# Patient Record
Sex: MALE | Race: BLACK OR AFRICAN AMERICAN | Employment: FULL TIME | URBAN - METROPOLITAN AREA
[De-identification: names, ages, dates, MRNs, and addresses within clinical notes are randomized per-mention and may not be internally consistent; named-entity substitution may affect disease eponyms.]

---

## 2019-12-18 ENCOUNTER — HOSPITAL ENCOUNTER (EMERGENCY)
Age: 44
Discharge: HOME | End: 2019-12-18
Attending: EMERGENCY MEDICINE
Payer: OTHER GOVERNMENT

## 2019-12-18 VITALS
RESPIRATION RATE: 33 BRPM | DIASTOLIC BLOOD PRESSURE: 88 MMHG | TEMPERATURE: 98 F | SYSTOLIC BLOOD PRESSURE: 145 MMHG | HEART RATE: 114 BPM | OXYGEN SATURATION: 95 %

## 2019-12-18 DIAGNOSIS — K42.9 UMBILICAL HERNIA WITHOUT OBSTRUCTION AND WITHOUT GANGRENE: Primary | ICD-10-CM

## 2019-12-18 DIAGNOSIS — R10.84 ABDOMINAL PAIN, GENERALIZED: ICD-10-CM

## 2019-12-18 LAB
ALBUMIN SERPL-MCNC: 4.5 G/DL (ref 3.4–5)
ALBUMIN/GLOB SERPL: 0.9 {RATIO} (ref 0.8–1.7)
ALP SERPL-CCNC: 205 U/L (ref 45–117)
ALT SERPL-CCNC: 55 U/L (ref 16–61)
AMPHET UR QL SCN: NEGATIVE
ANION GAP SERPL CALC-SCNC: 11 MMOL/L (ref 3–18)
AST SERPL-CCNC: 24 U/L (ref 10–38)
BARBITURATES UR QL SCN: NEGATIVE
BASOPHILS # BLD: 0 K/UL (ref 0–0.1)
BASOPHILS NFR BLD: 0 % (ref 0–2)
BENZODIAZ UR QL: NEGATIVE
BILIRUB SERPL-MCNC: 0.3 MG/DL (ref 0.2–1)
BUN SERPL-MCNC: 15 MG/DL (ref 7–18)
BUN/CREAT SERPL: 11 (ref 12–20)
CALCIUM SERPL-MCNC: 10 MG/DL (ref 8.5–10.1)
CANNABINOIDS UR QL SCN: NEGATIVE
CHLORIDE SERPL-SCNC: 99 MMOL/L (ref 100–111)
CO2 SERPL-SCNC: 26 MMOL/L (ref 21–32)
COCAINE UR QL SCN: NEGATIVE
CREAT SERPL-MCNC: 1.41 MG/DL (ref 0.6–1.3)
DIFFERENTIAL METHOD BLD: ABNORMAL
EOSINOPHIL # BLD: 0 K/UL (ref 0–0.4)
EOSINOPHIL NFR BLD: 0 % (ref 0–5)
ERYTHROCYTE [DISTWIDTH] IN BLOOD BY AUTOMATED COUNT: 13.3 % (ref 11.6–14.5)
GLOBULIN SER CALC-MCNC: 5.1 G/DL (ref 2–4)
GLUCOSE SERPL-MCNC: 239 MG/DL (ref 74–99)
HCT VFR BLD AUTO: 49.6 % (ref 36–48)
HDSCOM,HDSCOM: NORMAL
HGB BLD-MCNC: 18 G/DL (ref 13–16)
LIPASE SERPL-CCNC: 64 U/L (ref 73–393)
LYMPHOCYTES # BLD: 0.6 K/UL (ref 0.9–3.6)
LYMPHOCYTES NFR BLD: 6 % (ref 21–52)
MCH RBC QN AUTO: 31.9 PG (ref 24–34)
MCHC RBC AUTO-ENTMCNC: 36.3 G/DL (ref 31–37)
MCV RBC AUTO: 87.9 FL (ref 74–97)
METHADONE UR QL: NEGATIVE
MONOCYTES # BLD: 0.3 K/UL (ref 0.05–1.2)
MONOCYTES NFR BLD: 3 % (ref 3–10)
NEUTS SEG # BLD: 8.4 K/UL (ref 1.8–8)
NEUTS SEG NFR BLD: 91 % (ref 40–73)
OPIATES UR QL: NEGATIVE
PCP UR QL: NEGATIVE
PLATELET # BLD AUTO: 363 K/UL (ref 135–420)
PMV BLD AUTO: 10 FL (ref 9.2–11.8)
POTASSIUM SERPL-SCNC: 3.5 MMOL/L (ref 3.5–5.5)
PROT SERPL-MCNC: 9.6 G/DL (ref 6.4–8.2)
RBC # BLD AUTO: 5.64 M/UL (ref 4.7–5.5)
SODIUM SERPL-SCNC: 136 MMOL/L (ref 136–145)
WBC # BLD AUTO: 9.2 K/UL (ref 4.6–13.2)

## 2019-12-18 PROCEDURE — 99285 EMERGENCY DEPT VISIT HI MDM: CPT

## 2019-12-18 PROCEDURE — 74011250636 HC RX REV CODE- 250/636: Performed by: EMERGENCY MEDICINE

## 2019-12-18 PROCEDURE — 96374 THER/PROPH/DIAG INJ IV PUSH: CPT

## 2019-12-18 PROCEDURE — 85025 COMPLETE CBC W/AUTO DIFF WBC: CPT

## 2019-12-18 PROCEDURE — 80053 COMPREHEN METABOLIC PANEL: CPT

## 2019-12-18 PROCEDURE — 74011250637 HC RX REV CODE- 250/637: Performed by: EMERGENCY MEDICINE

## 2019-12-18 PROCEDURE — 83690 ASSAY OF LIPASE: CPT

## 2019-12-18 PROCEDURE — 80307 DRUG TEST PRSMV CHEM ANLYZR: CPT

## 2019-12-18 RX ORDER — ACETAMINOPHEN 500 MG
1000 TABLET ORAL ONCE
Status: COMPLETED | OUTPATIENT
Start: 2019-12-18 | End: 2019-12-18

## 2019-12-18 RX ORDER — DICYCLOMINE HYDROCHLORIDE 20 MG/1
20 TABLET ORAL EVERY 6 HOURS
Qty: 20 TAB | Refills: 0 | Status: SHIPPED | OUTPATIENT
Start: 2019-12-18 | End: 2019-12-23

## 2019-12-18 RX ORDER — KETOROLAC TROMETHAMINE 15 MG/ML
15 INJECTION, SOLUTION INTRAMUSCULAR; INTRAVENOUS ONCE
Status: COMPLETED | OUTPATIENT
Start: 2019-12-18 | End: 2019-12-18

## 2019-12-18 RX ORDER — ONDANSETRON 4 MG/1
4 TABLET, ORALLY DISINTEGRATING ORAL
Qty: 10 TAB | Refills: 0 | Status: SHIPPED | OUTPATIENT
Start: 2019-12-18 | End: 2019-12-21

## 2019-12-18 RX ORDER — NAPROXEN 500 MG/1
500 TABLET ORAL 2 TIMES DAILY WITH MEALS
Qty: 14 TAB | Refills: 0 | Status: SHIPPED | OUTPATIENT
Start: 2019-12-18 | End: 2019-12-25

## 2019-12-18 RX ADMIN — ACETAMINOPHEN 1000 MG: 500 TABLET ORAL at 08:30

## 2019-12-18 RX ADMIN — SODIUM CHLORIDE, SODIUM LACTATE, POTASSIUM CHLORIDE, AND CALCIUM CHLORIDE 1000 ML: 600; 310; 30; 20 INJECTION, SOLUTION INTRAVENOUS at 11:12

## 2019-12-18 RX ADMIN — KETOROLAC TROMETHAMINE 15 MG: 15 INJECTION, SOLUTION INTRAMUSCULAR; INTRAVENOUS at 08:30

## 2019-12-18 NOTE — DISCHARGE INSTRUCTIONS
Patient Education        Hernia: Care Instructions  Your Care Instructions    A hernia develops when tissue bulges through a weak spot in the wall of your belly. The groin area and the navel are common areas for a hernia. A hernia can also develop near the area of a surgery you had before. Pressure from lifting, straining, or coughing can tear the weak area, causing the hernia to bulge and be painful. If you cannot push a hernia back into place, the tissue may become trapped outside the belly wall. If the hernia gets twisted and loses its blood supply, it will swell and die. This is called a strangulated hernia. It usually causes a lot of pain. It needs treatment right away. Some hernias need to be repaired to prevent a strangulated hernia. If your hernia causes symptoms or is large, you may need surgery. Follow-up care is a key part of your treatment and safety. Be sure to make and go to all appointments, and call your doctor if you are having problems. It's also a good idea to know your test results and keep a list of the medicines you take. How can you care for yourself at home? · Take care when lifting heavy objects. · Stay at a healthy weight. · Do not smoke. Smoking can cause coughing, which can cause your hernia to bulge. If you need help quitting, talk to your doctor about stop-smoking programs and medicines. These can increase your chances of quitting for good. · Talk with your doctor before wearing a corset or truss for a hernia. These devices are not recommended for treating hernias and sometimes can do more harm than good. There may be certain situations when your doctor thinks a truss would work, but these are rare. When should you call for help? Call your doctor now or seek immediate medical care if:    · You have new or worse belly pain.     · You are vomiting.     · You cannot pass stools or gas.     · You cannot push the hernia back into place with gentle pressure when you are lying down.   · The area over the hernia turns red or becomes tender.    Watch closely for changes in your health, and be sure to contact your doctor if you have any problems. Where can you learn more? Go to http://lisset-nicole.info/. Enter C129 in the search box to learn more about \"Hernia: Care Instructions. \"  Current as of: November 7, 2018  Content Version: 12.2  © 3366-9817 "[employer]. Care instructions adapted under license by [employer] (which disclaims liability or warranty for this information). If you have questions about a medical condition or this instruction, always ask your healthcare professional. [place] [address] any warranty or liability for your use of this information. Patient Education        Abdominal Pain: Care Instructions  Your Care Instructions    Abdominal pain has many possible causes. Some aren't serious and get better on their own in a few days. Others need more testing and treatment. If your pain continues or gets worse, you need to be rechecked and may need more tests to find out what is wrong. You may need surgery to correct the problem. Don't ignore new symptoms, such as fever, nausea and vomiting, urination problems, pain that gets worse, and dizziness. These may be signs of a more serious problem. Your doctor may have recommended a follow-up visit in the next 8 to 12 hours. If you are not getting better, you may need more tests or treatment. The doctor has checked you carefully, but problems can develop later. If you notice any problems or new symptoms, get medical treatment right away. Follow-up care is a key part of your treatment and safety. Be sure to make and go to all appointments, and call your doctor if you are having problems. It's also a good idea to know your test results and keep a list of the medicines you take. How can you care for yourself at home? · Rest until you feel better.   · To prevent dehydration, drink plenty of fluids, enough so that your urine is light yellow or clear like water. Choose water and other caffeine-free clear liquids until you feel better. If you have kidney, heart, or liver disease and have to limit fluids, talk with your doctor before you increase the amount of fluids you drink. · If your stomach is upset, eat mild foods, such as rice, dry toast or crackers, bananas, and applesauce. Try eating several small meals instead of two or three large ones. · Wait until 48 hours after all symptoms have gone away before you have spicy foods, alcohol, and drinks that contain caffeine. · Do not eat foods that are high in fat. · Avoid anti-inflammatory medicines such as aspirin, ibuprofen (Advil, Motrin), and naproxen (Aleve). These can cause stomach upset. Talk to your doctor if you take daily aspirin for another health problem. When should you call for help? Call 911 anytime you think you may need emergency care. For example, call if:    · You passed out (lost consciousness).     · You pass maroon or very bloody stools.     · You vomit blood or what looks like coffee grounds.     · You have new, severe belly pain.    Call your doctor now or seek immediate medical care if:    · Your pain gets worse, especially if it becomes focused in one area of your belly.     · You have a new or higher fever.     · Your stools are black and look like tar, or they have streaks of blood.     · You have unexpected vaginal bleeding.     · You have symptoms of a urinary tract infection. These may include:  ? Pain when you urinate. ? Urinating more often than usual.  ? Blood in your urine.     · You are dizzy or lightheaded, or you feel like you may faint.    Watch closely for changes in your health, and be sure to contact your doctor if:    · You are not getting better after 1 day (24 hours). Where can you learn more? Go to http://lisset-nicole.info/.   Enter C201 in the search box to learn more about \"Abdominal Pain: Care Instructions. \"  Current as of: June 26, 2019  Content Version: 12.2  © 6319-7926 [employer]. Care instructions adapted under license by [employer] (which disclaims liability or warranty for this information). If you have questions about a medical condition or this instruction, always ask your healthcare professional. [address] any warranty or liability for your use of this information.

## 2019-12-18 NOTE — ED PROVIDER NOTES
EMERGENCY DEPARTMENT HISTORY AND PHYSICAL EXAM    8:22 AM      Date: 12/18/2019  Patient Name: Mehreen Ramos    History of Presenting Illness     Chief Complaint   Patient presents with    Abdominal Pain         History Provided By: Patient      Additional History (Context): Mehreen Ramos is a 53 y.o. male with MELANIE who presents with abdominal pain. Started last night, he states it is around his hernia, also throughout his belly, he has had this hernia for a long time around his umbilicus, he has never seen a surgeon. It has been stuck out for a long time but he can always push it in. He had one episode of vomiting this morning and then vomited again in the ER once he arrived. No fevers, no urinary symptoms, had a bowel movement that was normal for him this morning. Of note, his wife states that she thought he used IV heroin this morning, patient denies this on interview but continues to fall asleep. PCP: None    Current Outpatient Medications   Medication Sig Dispense Refill    naproxen (NAPROSYN) 500 mg tablet Take 1 Tab by mouth two (2) times daily (with meals) for 7 days. 14 Tab 0    dicyclomine (BENTYL) 20 mg tablet Take 1 Tab by mouth every six (6) hours for 20 doses. 20 Tab 0    ondansetron (ZOFRAN ODT) 4 mg disintegrating tablet Take 1 Tab by mouth every eight (8) hours as needed for Nausea for up to 3 days. 10 Tab 0       Past History     Past Medical History:  No past medical history on file. Past Surgical History:  No past surgical history on file. Family History:  No family history on file. Social History:  Social History     Tobacco Use    Smoking status: Not on file   Substance Use Topics    Alcohol use: Not on file    Drug use: Not on file       Allergies:  No Known Allergies      Review of Systems       Review of Systems   Constitutional: Negative. Negative for activity change, chills and fever. HENT: Negative. Negative for ear pain, hearing loss and sore throat.     Eyes: Negative. Negative for pain and visual disturbance. Respiratory: Negative. Negative for cough, chest tightness and shortness of breath. Cardiovascular: Negative. Negative for chest pain and leg swelling. Gastrointestinal: Positive for abdominal pain, nausea and vomiting. Negative for abdominal distention. Genitourinary: Negative. Negative for dysuria and hematuria. Musculoskeletal: Negative. Skin: Negative. Negative for rash. Neurological: Negative. Negative for dizziness and headaches. Psychiatric/Behavioral: Negative. Negative for agitation and behavioral problems. Physical Exam     Visit Vitals  /88   Pulse (!) 114   Temp 98 °F (36.7 °C)   Resp (!) 33   SpO2 95%         Physical Exam  Constitutional:       Appearance: He is well-developed. Comments: Sleepy   HENT:      Head: Normocephalic and atraumatic. Eyes:      General:         Right eye: No discharge. Left eye: No discharge. Pupils: Pupils are equal, round, and reactive to light. Comments: Pinpoint pupils   Neck:      Musculoskeletal: Normal range of motion and neck supple. Vascular: No JVD. Trachea: No tracheal deviation. Cardiovascular:      Rate and Rhythm: Regular rhythm. Tachycardia present. Heart sounds: Normal heart sounds. No murmur. Pulmonary:      Effort: Pulmonary effort is normal. No respiratory distress. Breath sounds: Normal breath sounds. No wheezing or rales. Abdominal:      General: Bowel sounds are normal. There is no distension. Palpations: Abdomen is soft. Tenderness: There is no tenderness. There is no rebound. Comments: Moderately sized umbilical hernia present, soft, reducible, no skin color changes, no crepitus, does have pain in the right upper quadrant, positive Sharpe's   Musculoskeletal: Normal range of motion. General: No tenderness or deformity. Skin:     General: Skin is warm and dry.       Findings: No erythema or rash.   Neurological:      Cranial Nerves: No cranial nerve deficit. Comments: 5/5 strength UE/LE, 5/5 sensation UE/LE             Diagnostic Study Results     Labs -  No results found for this or any previous visit (from the past 12 hour(s)). Radiologic Studies -   No orders to display         Medical Decision Making   I am the first provider for this patient. I reviewed the vital signs, available nursing notes, past medical history, past surgical history, family history and social history. Vital Signs-Reviewed the patient's vital signs. Records Reviewed: Nursing Notes and Old Medical Records      Provider Notes (Medical Decision Making):     MDM  Number of Diagnoses or Management Options  Abdominal pain, generalized:   Umbilical hernia without obstruction and without gangrene:   Diagnosis management comments: Differential Diagnosis: Incarcerated hernia, strangulated hernia, cholecystitis, heroin abuse, withdrawal, bowel obstruction    Patient with umbilical hernia, however does not appear strangulated or incarcerated, more suggestive of cholecystitis, will obtain right upper quadrant bedside ultrasound, CMP, lipase, patient has had 2 episodes of vomiting, if not feeling significantly better after Tylenol and Toradol he will need a CT of his belly. He does appear to have used heroin this morning, will not give Narcan at this time, his sats did drop to 89% because he is not breathing quickly, will avoid Narcan if possible    Reevaluation: Patient allowed to rest, meds given, he is feeling better, no episodes of vomiting, no abdominal pain any longer, his vital signs have normalized despite the last charted vitals, on my pulse check it was within normal limits and he was not tachypneic. Discussed discharge versus further imaging and he is ready to go home, understands reasons to come back emergently for his hernia.   Bedside ultrasound of his gallbladder showing normal wall size, normal CBD, no pericholecystic fluid, no stones    Safe for d/c, careful return precautions given. Pt/family comfortable with plan    Nancy Pinon MD      Procedure Note - Limited Bedside Ultrasound- Gallbladder   8:17 AM  Performed by: dye  Indication: RUQ pain  Interpretation: normal  Transabdominal bedside US shows normal gallbladder   without gallbladder wall thickening, measuring 31mm. Gallstones were not seen. Pericholecystic fluid was not visualized. The common bile duct was measured at 30mm. The procedure took 1-15 minutes, and pt tolerated well. Diagnosis     Clinical Impression:   1. Umbilical hernia without obstruction and without gangrene    2. Abdominal pain, generalized        Disposition: home    Follow-up Information     Follow up With Specialties Details Why [facility]    [facility] EMERGENCY DEPT Emergency Medicine  As needed, If symptoms worsen [address]  [phone]    Amber Little MD Surgery In 1 week regarding your hernia [address] [address] [address] Barbosa In 3 days  [address] [facility] [address] [phone]           Discharge Medication List as of 12/18/2019  1:00 PM      START taking these medications    Details   naproxen (NAPROSYN) 500 mg tablet Take 1 Tab by mouth two (2) times daily (with meals) for 7 days. , Print, Disp-14 Tab, R-0      dicyclomine (BENTYL) 20 mg tablet Take 1 Tab by mouth every six (6) hours for 20 doses. , Print, Disp-20 Tab, R-0      ondansetron (ZOFRAN ODT) 4 mg disintegrating tablet Take 1 Tab by mouth every eight (8) hours as needed for Nausea for up to 3 days. , Print, Disp-10 Tab, R-0           _______________________________    Please note that this dictation was completed with obopay, the [employer] voice recognition software.   Quite often unanticipated grammatical, syntax, homophones, and other interpretive errors are inadvertently transcribed by the computer software. Please disregard these errors. Please excuse any errors that have escaped final proofreading.

## 2021-01-25 ENCOUNTER — HOSPITAL ENCOUNTER (OUTPATIENT)
Dept: PHYSICAL THERAPY | Age: 46
Discharge: HOME | End: 2021-01-25
Payer: OTHER GOVERNMENT

## 2021-01-25 PROCEDURE — 97535 SELF CARE MNGMENT TRAINING: CPT

## 2021-01-25 PROCEDURE — 97110 THERAPEUTIC EXERCISES: CPT

## 2021-01-25 PROCEDURE — 97162 PT EVAL MOD COMPLEX 30 MIN: CPT

## 2021-01-25 NOTE — PROGRESS NOTES
[facility], [address] [place] [address]  [phone] fax  Plan of Care/ Statement of Necessity for Physical Therapy Services     Patient name: Anna Martinez Start of Care: 2021   Referral source: Krissy Ott MD : 1975    Medical Diagnosis: Low back pain [M54.5]  Payor: Darren Sims / Plan: [id number] / Product Type: [employer] Programs /  Onset Date: Initial 2003, fell in 2020    Treatment Diagnosis: LBP, left LE pain/numbness/weakness   Prior Hospitalization: see medical history Provider#: [id number]   Medications: Verified on Patient summary List    Comorbidities: HTN, hx tobacco and alcohol use   Prior Level of Function: Independent with ADLs, functional, and work tasks with progressively worsening LBP and left LE weakness. The Plan of Care and following information is based on the information from the initial evaluation. Assessment/ key information:   Pt is a 50year old male who presents to therapy today with LBP and left LE pain/numbness/weakness. 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 Pt reports progressively worsening symptoms since and denies any new injury except falling in 2020 when his left LE gave out on him while walking. He reports having increased pain with standing/sitting and has trouble sleeping. He reports that his LE symptoms usually refer to the posterior/lateral left leg and he feels it is getting weaker. He reports noticing increased urinary and bowel urgency but no complete dysfunction, and denies saddle paraesthesias. Pt demonstrated decreased l/s and painful AROM. Global left LE weakness is noted, especially with left hip flex and knee EXT. 2+ B achilles reflexes, 2+ right patellar reflex, 1+ left patellar reflex.  Reported having decreased sensation to light palpation throughout the left LE. Possible passive SLR test on left>right for increased LBP and HS tightness. MMT left hip flex 2-/5, left knee EXT 2-/5. Educated pt to monitor his bowel/bladder urgency and to contact his MD if this worsens. Pt would benefit from physical therapy to improve the above impairments to help the pt return to performing ADLs, functional and work activities. Evaluation Complexity History HIGH Complexity :3+ comorbidities / personal factors will impact the outcome/ POC ; Examination HIGH Complexity : 4+ Standardized tests and measures addressing body structure, function, activity limitation and / or participation in recreation  ;Presentation MEDIUM Complexity : Evolving with changing characteristics  ; Clinical Decision Making HIGH Complexity : FOTO score of 1- 25   Overall Complexity Rating: MEDIUM  Problem List: pain affecting function, decrease ROM, decrease strength, impaired gait/ balance, decrease ADL/ functional abilitiies, decrease activity tolerance, decrease flexibility/ joint mobility and decrease transfer abilities   Treatment Plan may include any combination of the following: Therapeutic exercise, Therapeutic activities, Neuromuscular re-education, Physical agent/modality, Gait/balance training, Manual therapy, Patient education, Self Care training, Functional mobility training, Home safety training and Stair training  Patient / Family readiness to learn indicated by: asking questions, trying to perform skills and interest  Persons(s) to be included in education: patient (P)  Barriers to Learning/Limitations: None  Patient Goal (s): ease pain, minimize pain  Patient Self Reported Health Status: fair  Rehabilitation Potential: fair    Short Term Goals: To be accomplished in 2 treatments:  1. Pt will report compliance and independence to [facility] to help the pt manage their pain and symptoms. Eval: established   Long Term Goals: To be accomplished in 10 treatments:  1.  Pt will increase FOTO score to 45 points to improve ability to perform ADLs. Eval: 25 points  2. Pt will increase MMT left hip flex to 3/5, left knee EXT to 3/5 to improve ability to tolerate prolonged sitting. Eval: left hip flex 2-/5, left knee EXT 2-/5  3. Pt will increase AROM l/s flex to 25-50% of WNL, EXT to % of WNL, right rotation to 50-75% of WNL with mild to no increased LBP to improve ability to perform work activities. Eval: l/s flex 0-25% of WNL, EXT 50-75% of WNL, right rotation to 25-50% of WNL all with increased LBP. 4. Pt will report having moderate difficulty with performing his usual work, housework, or school activities to improve the pt's activity tolerance. Eval: extreme difficulty. Frequency / Duration: Patient to be seen 1-2 times per week for 10 treatments. Patient/ Caregiver education and instruction: Diagnosis, prognosis, self care, activity modification and exercises   [x]  Plan of care has been reviewed with JACQUELINE Lora, PT 1/25/2021 10:56 AM  _____________________________________________________________________  I certify that the above Therapy Services are being furnished while the patient is under my care. I agree with the treatment plan and certify that this therapy is necessary.     Physician's Signature:____________Date:_________TIME:________    ** Signature, Date and Time must be completed for valid certification **    Please sign and return to [facility] [address]  [phone] fax

## 2021-01-25 NOTE — PROGRESS NOTES
PT DAILY TREATMENT NOTE  10-18    Patient Name: Smitha Adames  Date:2021  : 1975  [x]  Patient  Verified  Payor: [facility] CCN / [facility] / Product Type: [employer] Programs /    In time: 9:48  Out time:10:32  Total Treatment Time (min): 42  Visit #: 1 of 10    Treatment Area: Low back pain [M54.5]    SUBJECTIVE  Pain Level (0-10 scale): 8  Any medication changes, allergies to medications, adverse drug reactions, diagnosis change, or new procedure performed?: [x] No    [] Yes (see summary sheet for update)  Subjective functional status/changes:   [] No changes reported  See POC    OBJECTIVE    20 min [x]Eval                  []Re-Eval     10 min Therapeutic Exercise:  [] See flow sheet : HEP instruction and demonstration   Rationale: increase ROM and increase strength to improve the patients ability to tolerate ADLs    12 min Self Care/Home Management: []  See flow sheet : pt education regarding anatomy and physiology of the spine/LEs and how it relates to the pt's condition, pt education on monitoring his bowel/bladder symptoms and to contact his MD/go to ED if this worsens   Rationale: increase ROM, increase strength and decrease pain/symptoms  to improve the patients ability to tolerate functional tasks. With   [x] TE   [] TA   [] neuro   [x] Other: Self Care/Home management Patient Education: [x] Review HEP    [] Progressed/Changed HEP based on:   [] positioning   [] body mechanics   [] transfers   [] heat/ice application    [] other:      Other Objective/Functional Measures: See evaluation. Pain Level (0-10 scale) post treatment: 8    ASSESSMENT/Changes in Function: Pt given HEP handout to perform. Pt understood exercises in HEP handout. Pt demonstrated decreased l/s and painful AROM. Global left LE weakness is noted, especially with left hip flex and knee EXT. 2+ B achilles reflexes, 2+ right patellar reflex, 1+ left patellar reflex.  Reported having decreased sensation to light palpation throughout the left LE. Possible passive SLR test on left>right for increased LBP and HS tightness. MMT left hip flex 2-/5, left knee EXT 2-/5. Educated pt to monitor his bowel/bladder urgency and to contact his MD if this worsens. Pt would benefit from physical therapy to improve the above impairments to help the pt return to performing ADLs, functional and work activities. Patient will continue to benefit from skilled PT services to modify and progress therapeutic interventions, address functional mobility deficits, address ROM deficits, address strength deficits, analyze and address soft tissue restrictions, analyze and cue movement patterns, analyze and modify body mechanics/ergonomics, assess and modify postural abnormalities, address imbalance/dizziness and instruct in home and community integration to attain remaining goals. [x]  See Plan of Care  []  See progress note/recertification  []  See Discharge Summary         Progress towards goals / Updated goals:  Short Term Goals: To be accomplished in 2 treatments:  1. Pt will report compliance and independence to [facility] to help the pt manage their pain and symptoms. Eval: established   Long Term Goals: To be accomplished in 10 treatments:  1. Pt will increase FOTO score to 45 points to improve ability to perform ADLs. Eval: 25 points  2. Pt will increase MMT left hip flex to 3/5, left knee EXT to 3/5 to improve ability to tolerate prolonged sitting. Eval: left hip flex 2-/5, left knee EXT 2-/5  3. Pt will increase AROM l/s flex to 25-50% of WNL, EXT to % of WNL, right rotation to 50-75% of WNL with mild to no increased LBP to improve ability to perform work activities. Eval: l/s flex 0-25% of WNL, EXT 50-75% of WNL, right rotation to 25-50% of WNL all with increased LBP.    4. Pt will report having moderate difficulty with performing his usual work, housework, or school activities to improve the pt's activity tolerance. Eval: extreme difficulty.      PLAN  [x]  Upgrade activities as tolerated     [x]  Continue plan of care  [x]  Update interventions per flow sheet       []  Discharge due to:_  []  Other:_      Олег Osborne, PT 1/25/2021  10:56 AM    Future Appointments   Date Time Provider Arturo Farnsworth   1/25/2021  9:45 AM Kamran De León, PT [facility]

## 2021-02-01 ENCOUNTER — HOSPITAL ENCOUNTER (OUTPATIENT)
Dept: PHYSICAL THERAPY | Age: 46
Discharge: HOME | End: 2021-02-01
Payer: OTHER GOVERNMENT

## 2021-02-01 PROCEDURE — 97112 NEUROMUSCULAR REEDUCATION: CPT

## 2021-02-01 PROCEDURE — 97110 THERAPEUTIC EXERCISES: CPT

## 2021-02-01 NOTE — PROGRESS NOTES
PT DAILY TREATMENT NOTE 10-18    Patient Name: Chaparro Navas  Date:2021  : 1975  [x]  Patient  Verified  Payor[facility] / [facility] / Product Type: Federal Funded Programs /    In [employer] time:11;15  Total Treatment Time (min): 55  Visit #: 2 of 10    Medicare/BCBS Only   Total Timed Codes (min):   1:1 Treatment Time:         Treatment Area: Low back pain [M54.5]    SUBJECTIVE  Pain Level (0-10 scale): 8  Any medication changes, allergies to medications, adverse drug reactions, diagnosis change, or new procedure performed?: [x] No    [] Yes (see summary sheet for update)  Subjective functional status/changes:   [] No changes reported  \"I tried some of the exercises at home, they were OK. The exercise where I was sitting down and stretching my leg was too painful. \"    OBJECTIVE    16 min Therapeutic Exercise:  [x] See flow sheet :   Rationale: increase ROM and increase strength to improve LE strength/mobility and  lumbar mobility to improve ease of ADLs and gait. 30 min Neuromuscular Re-education:  [x]  See flow sheet :   Rationale: increase strength, improve coordination, improve balance and increase proprioception  to improve the patients ability to perform functional tasks with improved abdominal brace utilization, improved control, and decreased risk for falls. With   [x] TE   [] TA   [x] neuro   [] other: Patient Education: [x] Review HEP    [x] Progressed/Changed HEP based on: quad weakness   [] positioning   [x] body mechanics   [] transfers   [] heat/ice application    [] other:      Other Objective/Functional Measures: -Initiated treatment per flowsheet     Pain Level (0-10 scale) post treatment: 9    ASSESSMENT/Changes in Function: Patient reports increased pain/difficulty with right lumbar side bend as opposed to left side bend. Main focus of today's session is careful/progressive introduction to posterior pelvic tilt hold with UE/LE movement. Patient presents with continued left quadriceps weakness and left LE numbness that impacts his stability with standing activities. Patient will continue to benefit from skilled PT services to modify and progress therapeutic interventions, address functional mobility deficits, address ROM deficits, address strength deficits, analyze and address soft tissue restrictions, analyze and cue movement patterns, analyze and modify body mechanics/ergonomics, assess and modify postural abnormalities and address imbalance/dizziness to attain remaining goals. []  See Plan of Care  []  See progress note/recertification  []  See Discharge Summary         Progress towards goals / Updated goals:  Short Term Goals: To be accomplished in 2 treatments:  1. Pt will report compliance and independence to [facility] to help the pt manage their pain and symptoms. Eval: established   Current: progressing, pt reports intermittent compliance  Long Term Goals: To be accomplished in 10 treatments:  1. Pt will increase FOTO score to 45 points to improve ability to perform ADLs. Eval: 25 points   Current:   2. Pt will increase MMT left hip flex to 3/5, left knee EXT to 3/5 to improve ability to tolerate prolonged sitting. Eval: left hip flex 2-/5, left knee EXT 2-/5   Current:   3. Pt will increase AROM l/s flex to 25-50% of WNL, EXT to % of WNL, right rotation to 50-75% of WNL with mild to no increased LBP to improve ability to perform work activities. Eval: l/s flex 0-25% of WNL, EXT 50-75% of WNL, right rotation to 25-50% of WNL all with increased LBP. Current:   4. Pt will report having moderate difficulty with performing his usual work, housework, or school activities to improve the pt's activity tolerance. Eval: extreme difficulty.   Current:      PLAN  [x]  Upgrade activities as tolerated     [x]  Continue plan of care  []  Update interventions per flow sheet       []  Discharge due to:_  []  Other:_      Debora Koehler [facility] 2/1/2021  8:52 AM    Future Appointments   Date Time Provider Department Center   2/1/2021 10:30 AM Roberto [facility]   2/9/2021  9:45 AM Jade Almazan [facility]   2/16/2021  9:45 AM Donita López [facility]   2/23/2021  9:45 AM Navi, [address]

## 2021-02-02 ENCOUNTER — APPOINTMENT (OUTPATIENT)
Dept: PHYSICAL THERAPY | Age: 46
End: 2021-02-02
Payer: OTHER GOVERNMENT

## 2021-02-11 ENCOUNTER — HOSPITAL ENCOUNTER (OUTPATIENT)
Dept: PHYSICAL THERAPY | Age: 46
Discharge: HOME | End: 2021-02-11
Payer: OTHER GOVERNMENT

## 2021-02-11 PROCEDURE — 97112 NEUROMUSCULAR REEDUCATION: CPT

## 2021-02-11 PROCEDURE — 97110 THERAPEUTIC EXERCISES: CPT

## 2021-02-11 NOTE — PROGRESS NOTES
PT DAILY TREATMENT NOTE     Patient Name: Tk Peng  Date:2021  : 1975  [x]  Patient  Verified  Payor: [facility] CCN / [facility] / Product Type: Federal Funded Programs /    In time:9:45  Out time:10:35  Total Treatment Time (min): 50  Visit #: 3 of 10    Medicare/BCBS Only   Total Timed Codes (min):   1:1 Treatment Time:         Treatment Area: Low back pain [M54.5]    SUBJECTIVE  Pain Level (0-10 scale): 7/10  Any medication changes, allergies to medications, adverse drug reactions, diagnosis change, or new procedure performed?: [x] No    [] Yes (see summary sheet for update)  Subjective functional status/changes:   [] No changes reported  \"I hurt after last session. I'm okay now, but I still have pain. \"    OBJECTIVE    8 min Therapeutic Exercise:  [] See flow sheet :   Rationale: increase ROM and increase strength to improve the patients ability to improve LE strength and trunk mobility for increase ease of ADLs    42 min Neuromuscular Re-education:  []  See flow sheet :   Rationale: increase strength, improve coordination and increase proprioception  to improve the patients ability to increase core activation and trunk stability to reduce strain on low back with ADLs, job duties          With   [] TE   [] TA   [x] neuro   [] other: Patient Education: [x] Review HEP    [] Progressed/Changed HEP based on:   [] positioning   [] body mechanics   [] transfers   [] heat/ice application    [] other:      Other Objective/Functional Measures: Performed exercises per flow sheet. Added trapeze bar hip x 3. Pain Level (0-10 scale) post treatment: -6/10    ASSESSMENT/Changes in Function: Pt given targets to work on increasing hip flexion during standing marches and hip flexion portions of trapeze bar exercise.   Pt given verbal and demonstrative cueing for proper TransA muscle contraction and technique with all plinth exercises to reduce strain to low back and not aggravate pain. Pt able to report slight decrease in pain symptoms and improved mobility after session. Patient will continue to benefit from skilled PT services to address functional mobility deficits, address ROM deficits, address strength deficits, analyze and address soft tissue restrictions, analyze and cue movement patterns, analyze and modify body mechanics/ergonomics, assess and modify postural abnormalities and instruct in home and community integration to attain remaining goals. []  See Plan of Care  []  See progress note/recertification  []  See Discharge Summary         Progress towards goals / Updated goals:  Short Term Goals: To be accomplished in 2 treatments:  1. Pt will report compliance and independence to [facility] to help the pt manage their pain and symptoms.           Eval: established   Current: progressing, pt reports intermittent compliance  Long Term Goals: To be accomplished in 10 treatments:  1. Pt will increase FOTO score to 45 points to improve ability to perform ADLs. Eval: 25 points   Current: reassess at MD note  2. Pt will increase MMT left hip flex to 3/5, left knee EXT to 3/5 to improve ability to tolerate prolonged sitting. Eval: left hip flex 2-/5, left knee EXT 2-/5   Current:   3. Pt will increase AROM l/s flex to 25-50% of WNL, EXT to % of WNL, right rotation to 50-75% of WNL with mild to no increased LBP to improve ability to perform work activities. Eval: l/s flex 0-25% of WNL, EXT 50-75% of WNL, right rotation to 25-50% of WNL all with increased LBP.   Current:   4. Pt will report having moderate difficulty with performing his usual work, housework, or school activities to improve the pt's activity tolerance.   Eval: extreme difficulty.   Current:     PLAN  [x]  Upgrade activities as tolerated     [x]  Continue plan of care  []  Update interventions per flow sheet       []  Discharge due to:_  []  Other:_      Olya Bourne PT 2/11/2021  9:51 AM    Future Appointments   Date Time Provider Arturo Farnsworth   2/16/2021  9:45 AM Sergio [facility]DY [address]   2/23/2021  9:45 AM Navi, [address]

## 2021-02-16 ENCOUNTER — HOSPITAL ENCOUNTER (OUTPATIENT)
Dept: PHYSICAL THERAPY | Age: 46
Discharge: HOME | End: 2021-02-16
Payer: OTHER GOVERNMENT

## 2021-02-16 PROCEDURE — 97110 THERAPEUTIC EXERCISES: CPT

## 2021-02-16 PROCEDURE — 97112 NEUROMUSCULAR REEDUCATION: CPT

## 2021-02-16 NOTE — PROGRESS NOTES
PT DAILY TREATMENT NOTE     Patient Name: Cyndee Latif  Date:2021  : 1975  [x]  Patient  Verified  Payor: [facility] CCN / Plan: [facility] CCN / Product Type: Federal Funded Programs /    In time:10:00  Out time:10:40  Total Treatment Time (min): 40  Visit #: 4 of 10    Medicare/BCBS Only   Total Timed Codes (min):   1:1 Treatment Time:         Treatment Area: Low back pain [M54.5]    SUBJECTIVE  Pain Level (0-10 scale): 7  Any medication changes, allergies to medications, adverse drug reactions, diagnosis change, or new procedure performed?: [x] No    [] Yes (see summary sheet for update)  Subjective functional status/changes:   [] No changes reported  I can stand and walk a little longer now    OBJECTIVE        10 min Therapeutic Exercise:  [] See flow sheet :   Rationale: increase ROM and increase strength to improve the patients ability to improve ease of ADL's     30 min Neuromuscular Re-education:  []  See flow sheet :   Rationale: increase strength and increase proprioception  to improve the patients ability to engage core for lumbar support, job duties          With   [] TE   [] TA   [] neuro   [] other: Patient Education: [x] Review HEP    [] Progressed/Changed HEP based on:   [] positioning   [] body mechanics   [] transfers   [] heat/ice application    [] other:      Other Objective/Functional Measures: ex's per card     Pain Level (0-10 scale) post treatment: 6    ASSESSMENT/Changes in Function: pt reports slight improvement with standing/walking tolerance.   Fatigued after session    Patient will continue to benefit from skilled PT services to modify and progress therapeutic interventions, address functional mobility deficits, address ROM deficits, address strength deficits, analyze and address soft tissue restrictions, analyze and cue movement patterns, analyze and modify body mechanics/ergonomics, assess and modify postural abnormalities, address imbalance/dizziness and instruct in home and community integration to attain remaining goals. []  See Plan of Care  []  See progress note/recertification  []  See Discharge Summary         Progress towards goals / Updated goals:  1. Pt will report compliance and independence to [facility] to help the pt manage their pain and symptoms.           Eval: established   Current: progressing, pt reports intermittent compliance   Long Term Goals: To be accomplished in 10 treatments:  1. Pt will increase FOTO score to 45 points to improve ability to perform ADLs. Eval: 25 points   Current: reassess at MD note  2. Pt will increase MMT left hip flex to 3/5, left knee EXT to 3/5 to improve ability to tolerate prolonged sitting. Eval: left hip flex 2-/5, left knee EXT 2-/5   Current:   3. Pt will increase AROM l/s flex to 25-50% of WNL, EXT to % of WNL, right rotation to 50-75% of WNL with mild to no increased LBP to improve ability to perform work activities. Eval: l/s flex 0-25% of WNL, EXT 50-75% of WNL, right rotation to 25-50% of WNL all with increased LBP.   Current:   4. Pt will report having moderate difficulty with performing his usual work, housework, or school activities to improve the pt's activity tolerance.   Eval: extreme difficulty.   Current:  improving but still dfficult    PLAN  [x]  Upgrade activities as tolerated     []  Continue plan of care  []  Update interventions per flow sheet       []  Discharge due to:_  [x]  Other:_  Reassess next session    Kenney Cuenca PTA 2/16/2021  10:13 AM    Future Appointments   Date Time Provider Arturo Farnsworth   2/23/2021  9:45 AM Navi, [address]

## 2021-02-23 ENCOUNTER — HOSPITAL ENCOUNTER (OUTPATIENT)
Dept: PHYSICAL THERAPY | Age: 46
Discharge: HOME | End: 2021-02-23
Payer: OTHER GOVERNMENT

## 2021-02-23 PROCEDURE — 97110 THERAPEUTIC EXERCISES: CPT

## 2021-02-23 PROCEDURE — 97530 THERAPEUTIC ACTIVITIES: CPT

## 2021-02-23 NOTE — PROGRESS NOTES
[facility] MALICK PERRIN [facility], [address] [place] [address]  [phone] fax    Progress Note  Patient name: Jeanne Castro Start of Care: 2021   Referral source: Naomy Paez MD : 1975   Medical/Treatment Diagnosis: Low back pain [M54.5]  Payor: Davonte Martin / Plan: [id number] / Product Type: [employer] Programs /  Onset Date:initial 2003, fell in 2020     Prior Hospitalization: see medical history Provider#: [id number]   Medications: Verified on Patient Summary List    Comorbidities: HTN, hx tobacco and alcohol use  Prior Level of Function: Independent with ADLs, functional, and work tasks with progressively worsening LBP and left LE weakness. Visits from Start of Care: 5    Missed Visits: 1    Short Term Goals: To be accomplished in 1 week:  1. Pt will report compliance and independence to HEP to help the pt manage their pain and symptoms.           Eval: established   Current: progressing - Pt reports intermittent compliance   Long Term Goals: To be accomplished in 10 treatments:  1. Pt will increase FOTO score to 45 points to improve ability to perform ADLs. Eval: 25 points   Current: not met - FOTO 23 pts  2. Pt will increase MMT left hip flex to 3/5, left knee EXT to 3/5 to improve ability to tolerate prolonged sitting. Eval: left hip flex 2-/5, left knee EXT 2-/5   Current: not met - knee extension 2/5, HS 2+/5, hip flexion 2/5  3. Pt will increase AROM l/s flex to 25-50% of WNL, EXT to % of WNL, right rotation to 50-75% of WNL with mild to no increased LBP to improve ability to perform work activities. Eval: l/s flex 0-25% of WNL, EXT 50-75% of WNL, right rotation to 25-50% of WNL all with increased LBP.   Current: not met - flexion 25%, extension 25%, left rotation 50%, left 25% limited by pain in all directions  4.  Pt will report having moderate difficulty with performing his usual work, housework, or school activities to improve the pt's activity tolerance.   Eval: extreme difficulty. Current: progressing - improving but still moderate to extreme dificulty    Key Functional Changes: Patient has attended 5 sessions of skilled therapy, including the evaluation for LBP and left LE pain, weakness. Functionally, Pt reports that he is limited in standing/walking tolerance to 20 minutes max. Strength in left LE is 2/5 grossly, affecting stability with gait and extreme difficulty with stair negotiation. At time of last visit, Pt reported the following:  Functional Gains: slightly less pain. Sleeping better due having CPAP  Functional Deficits: persistent pain, weakness in left LE,  Limited standing/walking 20 minutes with left knee buckling. Extremely limited with bending to put on shoes, dressing. Wears back brace to assist with activities          Pain best/worst: 4-5/10 to 9/10 but can increase to 10+ intermittently    Pt would benefit from continued skilled therapy to improve lumbar mobility, increase core/trunk stability, increase left LE strength and stability to improve standing/amb tolerance, gait mechanics, and overall function with ADLs and job duties. Updated Goals: to be achieved in 10 treatments:  1. Pt will report compliance and independence to [facility] to help the pt manage their pain and symptoms.           Status at last note/certification: intermittent compliance  Current:    1. Pt will increase FOTO score to 45 points to improve ability to perform ADLs. Status at last note/certification: FOTO 23 pts   Current:   2. Pt will increase MMT left hip flex to 3/5, left knee EXT to 3/5 to improve ability to tolerate prolonged sitting.   Status at last note/certification: left hip flexion 2/5; left knee ext 2/5; knee flex 2+/5   Current:   3. Pt will increase AROM l/s flex to 25-50% of WNL, EXT to % of WNL, right rotation to 50-75% of WNL with mild to no increased LBP to improve ability to perform work activities. Status at last note/certification: flexion 25%, extension 25%, left rotation 50%, left 25% limited by pain in all directions  Current:   4. Pt will report having moderate difficulty with performing his usual work, housework, or school activities to improve the pt's activity tolerance.   Status at last note/certification: improving but still moderate to extreme difficulty. Current:      ASSESSMENT/RECOMMENDATIONS:  [x]Continue therapy per initial plan/protocol at a frequency of  1-2 x per week for 10 treatments  []Continue therapy with the following recommended changes:_____________________      _____________________________________________________________________  []Discontinue therapy progressing towards or have reached established goals  []Discontinue therapy due to lack of appreciable progress towards goals  []Discontinue therapy due to lack of attendance or compliance  []Await Physician's recommendations/decisions regarding therapy  []Other:________________________________________________________________    Thank you for this referral.   Sabrina Bourne, PT 2/23/2021 11:19 AM  NOTE TO PHYSICIAN:  [address]   FAX TO [facility]: [phone]  If you are unable to process this request in 24 hours please contact our office: [phone]  []  I have read the above report and request that my patient continue as recommended. []  I have read the above report and request that my patient continue therapy with the following changes/special instructions:________________________________________  []I have read the above report and request that my patient be discharged from therapy.     Physician's Signature:____________Date:_________TIME:________     Sonia Reddy MD  ** Signature, Date and Time must be completed for valid certification **

## 2021-02-23 NOTE — PROGRESS NOTES
PT DAILY TREATMENT NOTE     Patient Name: Prem Zimmer  Date:2021  : 1975  [x]  Patient  Verified  Payor: [facility]N / [facility] / Product Type: Federal Funded Programs /    In time:9:45  Out time: 10:30  Total Treatment Time (min): 45  Visit #: 5 of 10    Medicare/BCBS Only   Total Timed Codes (min):   1:1 Treatment Time:         Treatment Area: Low back pain [M54.5]    SUBJECTIVE  Pain Level (0-10 scale): 8-9  Any medication changes, allergies to medications, adverse drug reactions, diagnosis change, or new procedure performed?: [x] No    [] Yes (see summary sheet for update)  Subjective functional status/changes:   [] No changes reported  flare of pain began last night, insure as to why. OBJECTIVE      30 min Therapeutic Exercise:  [] See flow sheet :   Rationale: increase ROM and increase strength to improve the patients ability to improve lumabr mobility for functional tasks    15 min Therapeutic Activity:  []  See flow sheet :  FOTO reassess   Rationale: increase ROM and increase strength  to improve the patients ability to perform dressing, ADL's, household tasks               With   [] TE   [] TA   [] neuro   [] other: Patient Education: [x] Review HEP    [] Progressed/Changed HEP based on:   [] positioning   [] body mechanics   [] transfers   [] heat/ice application    [] other:      Other Objective/Functional Measures:   Gains: slightly less pain. Sleeping better due having CPAP  Deficits: persistent pain, weakness in left LE,  Limited standing/walking 20 minutes with left knee buckling. Extremely limited with bending to put on shoes, dressing.   Wears back brace to assist with activities   Pain best/wort: 4-5/10-9/10 but can increase to 10+  Pt goal: to increase standing/walking tolerance, prepare to return to the gym    Pain Level (0-10 scale) post treatment: 8    ASSESSMENT/Changes in Function: see goals    Patient will continue to benefit from skilled PT services to modify and progress therapeutic interventions, address functional mobility deficits, address ROM deficits, address strength deficits, analyze and address soft tissue restrictions, analyze and cue movement patterns, analyze and modify body mechanics/ergonomics, assess and modify postural abnormalities, address imbalance/dizziness and instruct in home and community integration to attain remaining goals. []  See Plan of Care  []  See progress note/recertification  []  See Discharge Summary         Progress towards goals / Updated goals:  1. Pt will report compliance and independence to [facility] to help the pt manage their pain and symptoms.           Eval: established   Current: progressing, pt reports intermittent compliance   Long Term Goals: To be accomplished in 10 treatments:  1. Pt will increase FOTO score to 45 points to improve ability to perform ADLs. Eval: 25 points   Current: FOTO 23, not met  2. Pt will increase MMT left hip flex to 3/5, left knee EXT to 3/5 to improve ability to tolerate prolonged sitting. Eval: left hip flex 2-/5, left knee EXT 2-/5   Current: not met  Knee extension 2/5, HS 2+/5, hip flexion 2/5  3. Pt will increase AROM l/s flex to 25-50% of WNL, EXT to % of WNL, right rotation to 50-75% of WNL with mild to no increased LBP to improve ability to perform work activities. Eval: l/s flex 0-25% of WNL, EXT 50-75% of WNL, right rotation to 25-50% of WNL all with increased LBP.   Current: flexion 25%, extension 25%, left rotation 50%, left 25% limited by pain in all directions  4. Pt will report having moderate difficulty with performing his usual work, housework, or school activities to improve the pt's activity tolerance.   Eval: extreme difficulty.   Current:  improving but still dfficult    PLAN  [x]  Upgrade activities as tolerated     []  Continue plan of care  []  Update interventions per flow sheet       []  Discharge due to:_  []  Other:_      [facility] Clay Kraft, PTA 2/23/2021  10:10 AM    No future appointments.

## 2021-03-03 ENCOUNTER — HOSPITAL ENCOUNTER (OUTPATIENT)
Dept: PHYSICAL THERAPY | Age: 46
Discharge: HOME | End: 2021-03-03
Payer: OTHER GOVERNMENT

## 2021-03-03 PROCEDURE — 97110 THERAPEUTIC EXERCISES: CPT

## 2021-03-03 PROCEDURE — 97112 NEUROMUSCULAR REEDUCATION: CPT

## 2021-03-03 NOTE — PROGRESS NOTES
PT DAILY TREATMENT NOTE     Patient Name: kT Peng  Date:3/3/2021  : 1975  [x]  Patient  Verified  Payor: [facility] / [facility] / Product Type: [employer] /    In [employer]  Out time: 5853  Total Treatment Time (min): 55  Visit #: 6 of 10    Medicare/BCBS Only   Total Timed Codes (min):   1:1 Treatment Time:         Treatment Area: Low back pain [M54.5]    SUBJECTIVE  Pain Level (0-10 scale): 9  Any medication changes, allergies to medications, adverse drug reactions, diagnosis change, or new procedure performed?: [x] No    [] Yes (see summary sheet for update)  Subjective functional status/changes:   [] No changes reported  \"in a little more pain the last 3-4 days\"; pt reports next follow up with MD next month    OBJECTIVE      Modality rationale: decrease pain and increase tissue extensibility to improve the patients ability to perform ambulation and LE ADL's with decreased discomfort   Min Type Additional Details    [] Estim:  []Unatt       []IFC  []Premod                        []Other:  []w/ice   []w/heat  Position:  Location:    [] Estim: []Att    []TENS instruct  []NMES                    []Other:  []w/US   []w/ice   []w/heat  Position:  Location:    []  Traction: [] Cervical       []Lumbar                       [] Prone          []Supine                       []Intermittent   []Continuous Lbs:  [] before manual  [] after manual    []  Ultrasound: []Continuous   [] Pulsed                           []1MHz   []3MHz W/cm2:  Location:    []  Iontophoresis with dexamethasone         Location: [] Take home patch   [] In clinic   10 []  Ice     [x]  heat  []  Ice massage  []  Laser   []  Anodyne Position: sitting  Location: low back    []  Laser with stim  []  Other:  Position:  Location:    []  Vasopneumatic Device Pressure:       [] lo [] med [] hi   Temperature: [] lo [] med [] hi   [x] Skin assessment post-treatment:  [x]intact []redness- no adverse reaction    []redness  adverse reaction:     15 min Therapeutic Exercise:  [x] See flow sheet :   Rationale: increase ROM and increase strength to improve the patients ability to improve lumabr mobility for functional tasks    30 min Neuromuscular Re-education:  [x]? See flow sheet :   Rationale: increase strength, improve coordination and increase proprioception  to improve the patients ability to increase core activation and trunk stability to reduce strain on low back with ADLs, job duties              With   [] TE   [] TA   [] neuro   [] other: Patient Education: [x] Review HEP    [] Progressed/Changed HEP based on:   [] positioning   [] body mechanics   [] transfers   [] heat/ice application    [] other:      Other Objective/Functional Measures: Therex performed as per flow sheet    Pain Level (0-10 scale) post treatment: 6    ASSESSMENT/Changes in Function: Pt reports worsening of urge incontinence; Pt advised to make appointment with MD regarding symptoms as soon as possible; patient verbalized understanding (\"I have an appointment on the 25th\". Pt reports compliance with provided HEP ~ 2x/week. Improved subjective reports of pain post modalities MHP. Patient will continue to benefit from skilled PT services to modify and progress therapeutic interventions, address functional mobility deficits, address ROM deficits, address strength deficits, analyze and address soft tissue restrictions, analyze and cue movement patterns, analyze and modify body mechanics/ergonomics, assess and modify postural abnormalities, address imbalance/dizziness and instruct in home and community integration to attain remaining goals. []  See Plan of Care  []  See progress note/recertification  []  See Discharge Summary         Progress towards goals / Updated goals:  Updated Goals: to be achieved in 10 treatments:  1.  Pt will report compliance and independence to HEP to help the pt manage their pain and symptoms.           Status at last note/certification: intermittent compliance  Current:  \"I try to do them twice a week when I'm not here\" 03/03/2021  1. Pt will increase FOTO score to 45 points to improve ability to perform ADLs. Status at last note/certification: FOTO 23 pts   Current:   2. Pt will increase MMT left hip flex to 3/5, left knee EXT to 3/5 to improve ability to tolerate prolonged sitting. Status at last note/certification: left hip flexion 2/5; left knee ext 2/5; knee flex 2+/5   Current:   3. Pt will increase AROM l/s flex to 25-50% of WNL, EXT to % of WNL, right rotation to 50-75% of WNL with mild to no increased LBP to improve ability to perform work activities. Status at last note/certification: flexion 25%, extension 25%, left rotation 50%, left 25% limited by pain in all directions  Current:   4. Pt will report having moderate difficulty with performing his usual work, housework, or school activities to improve the pt's activity tolerance.   Status at last note/certification: improving but still moderate to extreme difficulty.   Current:      PLAN  [x]  Upgrade activities as tolerated     [x]  Continue plan of care  []  Update interventions per flow sheet       []  Discharge due to:_  []  Other:_      Lona Kidd, PT 3/3/2021 1107 am    Future Appointments   Date Time Provider Arturo Farnsworth   3/3/2021  9:45 AM Valdez Rosas, PT [facility]   3/9/2021 10:30 AM Serge Bang [facility]   3/16/2021  9:00 AM Matilda Bourne, PT [facility]   3/23/2021  9:45 AM Navi [address]

## 2021-03-09 ENCOUNTER — HOSPITAL ENCOUNTER (OUTPATIENT)
Dept: PHYSICAL THERAPY | Age: 46
Discharge: HOME | End: 2021-03-09
Payer: OTHER GOVERNMENT

## 2021-03-09 PROCEDURE — 97110 THERAPEUTIC EXERCISES: CPT

## 2021-03-09 PROCEDURE — 97112 NEUROMUSCULAR REEDUCATION: CPT

## 2021-03-09 NOTE — PROGRESS NOTES
PT DAILY TREATMENT NOTE     Patient Name: Chandni Pang  Date:3/9/2021  : 1975  [x]  Patient  Verified  Payor: [facility] CCN / [facility] / Product Type: Federal Funded Programs /    In time:10:30  Out time:11:25  Total Treatment Time (min): 55  Visit #: 2 of 10    Medicare/BCBS Only   Total Timed Codes (min):   1:1 Treatment Time:         Treatment Area: Low back pain [M54.5]    SUBJECTIVE  Pain Level (0-10 scale): 8  Any medication changes, allergies to medications, adverse drug reactions, diagnosis change, or new procedure performed?: [x] No    [] Yes (see summary sheet for update)  Subjective functional status/changes:   [] No changes reported  The pain is still high    OBJECTIVE    Modality rationale: decrease pain to improve the patients ability to improve ease of function   Min Type Additional Details    [] Estim:  []Unatt       []IFC  []Premod                        []Other:  []w/ice   []w/heat  Position:  Location:    [] Estim: []Att    []TENS instruct  []NMES                    []Other:  []w/US   []w/ice   []w/heat  Position:  Location:    []  Traction: [] Cervical       []Lumbar                       [] Prone          []Supine                       []Intermittent   []Continuous Lbs:  [] before manual  [] after manual    []  Ultrasound: []Continuous   [] Pulsed                           []1MHz   []3MHz W/cm2:  Location:    []  Iontophoresis with dexamethasone         Location: [] Take home patch   [] In clinic   10 []  Ice     [x]  heat  []  Ice massage  []  Laser   []  Anodyne Position: sitting  Location: LB    []  Laser with stim  []  Other:  Position:  Location:    []  Vasopneumatic Device Pressure:       [] lo [] med [] hi   Temperature: [] lo [] med [] hi   [x] Skin assessment post-treatment:  [x]intact []redness- no adverse reaction    []redness  adverse reaction:       15 min Therapeutic Exercise:  [] See flow sheet :   Rationale: increase ROM and increase strength to improve the patients ability to to perform functional tasks     30 min Neuromuscular Re-education:  []  See flow sheet :   Rationale: increase strength and increase proprioception  to improve the patients ability to increase core stability to reduce strain on LB           With   [] TE   [] TA   [] neuro   [] other: Patient Education: [x] Review HEP    [] Progressed/Changed HEP based on:   [] positioning   [] body mechanics   [] transfers   [] heat/ice application    [] other:      Other Objective/Functional Measures: ex's per card     Pain Level (0-10 scale) post treatment: 8    ASSESSMENT/Changes in Function: pain level remains high. Rigid posturing    Patient will continue to benefit from skilled PT services to modify and progress therapeutic interventions, address functional mobility deficits, address ROM deficits, address strength deficits, analyze and address soft tissue restrictions, analyze and cue movement patterns, analyze and modify body mechanics/ergonomics, assess and modify postural abnormalities, address imbalance/dizziness and instruct in home and community integration to attain remaining goals. []  See Plan of Care  []  See progress note/recertification  []  See Discharge Summary         Progress towards goals / Updated goals:  1. Pt will report compliance and independence to HEP to help the pt manage their pain and symptoms.           Status at last note/certification: intermittent compliance  Current:  \"I try to do them twice a week when I'm not here\" 03/03/2021  1. Pt will increase FOTO score to 45 points to improve ability to perform ADLs. Status at last note/certification: FOTO 23 pts   Current:   2. Pt will increase MMT left hip flex to 3/5, left knee EXT to 3/5 to improve ability to tolerate prolonged sitting.   Status at last note/certification: left hip flexion 2/5; left knee ext 2/5; knee flex 2+/5   Current:   3. Pt will increase AROM l/s flex to 25-50% of WNL, EXT to % of WNL, right rotation to 50-75% of WNL with mild to no increased LBP to improve ability to perform work activities. Status at last note/certification: flexion 25%, extension 25%, left rotation 50%, left 25% limited by pain in all directions  Current:   4. Pt will report having moderate difficulty with performing his usual work, housework, or school activities to improve the pt's activity tolerance.   Status at last note/certification: improving but still moderate to extreme difficulty.   Current:         PLAN  [x]  Upgrade activities as tolerated     []  Continue plan of care  []  Update interventions per flow sheet       []  Discharge due to:_  []  Other:_      Hernandez Saavedra PTA 3/9/2021  10:40 AM    Future Appointments   Date Time Provider Arturo Farnsworth   3/16/2021  9:00 AM Reginald Bourne, PT [facility]   3/23/2021  9:45 AM Navi [address]

## 2021-03-16 ENCOUNTER — HOSPITAL ENCOUNTER (OUTPATIENT)
Dept: PHYSICAL THERAPY | Age: 46
Discharge: HOME | End: 2021-03-16
Payer: OTHER GOVERNMENT

## 2021-03-16 PROCEDURE — 97112 NEUROMUSCULAR REEDUCATION: CPT

## 2021-03-16 PROCEDURE — 97110 THERAPEUTIC EXERCISES: CPT

## 2021-03-16 PROCEDURE — 97530 THERAPEUTIC ACTIVITIES: CPT

## 2021-03-16 NOTE — PROGRESS NOTES
PT DAILY TREATMENT NOTE     Patient Name: Donya Mota  Date:3/16/2021  : 1975  [x]  Patient  Verified  Payor: [facility] CCN / Plan: [facility] CCN / Product Type: Federal Funded Programs /    In time:9:02  Out time:10:00  Total Treatment Time (min): 62   Visit #: 3 of 10    Medicare/BCBS Only   Total Timed Codes (min):   1:1 Treatment Time:         Treatment Area: Low back pain [M54.5]    SUBJECTIVE  Pain Level (0-10 scale): 8-9/10  Any medication changes, allergies to medications, adverse drug reactions, diagnosis change, or new procedure performed?: [x] No    [] Yes (see summary sheet for update)  Subjective functional status/changes:   [] No changes reported  \"For the last month, the pain has been this high. I do some of the exercises at home but I can't do very many because of the pain. \"    OBJECTIVE    Modality rationale: decrease pain to improve the patients ability to improve ease of ADL function   Min Type Additional Details    [] Estim:  []Unatt       []IFC  []Premod                        []Other:  []w/ice   []w/heat  Position:  Location:    [] Estim: []Att    []TENS instruct  []NMES                    []Other:  []w/US   []w/ice   []w/heat  Position:  Location:    []  Traction: [] Cervical       []Lumbar                       [] Prone          []Supine                       []Intermittent   []Continuous Lbs:  [] before manual  [] after manual    []  Ultrasound: []Continuous   [] Pulsed                           []1MHz   []3MHz W/cm2:  Location:    []  Iontophoresis with dexamethasone         Location: [] Take home patch   [] In clinic   10 []  Ice     [x]  heat  []  Ice massage  []  Laser   []  Anodyne Position: seated  Location: low back    []  Laser with stim  []  Other:  Position:  Location:    []  Vasopneumatic Device Pressure:       [] lo [] med [] hi   Temperature: [] lo [] med [] hi   [x] Skin assessment post-treatment:  [x]intact []redness- no adverse reaction []redness  adverse reaction:       15 min Therapeutic Exercise:  [] See flow sheet :   Rationale: increase ROM and increase strength to improve the patients ability to to perform functional tasks     15 min Therapeutic Activity:  []  See flow sheet : goals, plan for PT, reviewed and updated HEP   Rationale: increase ROM and increase strength  to improve the patients ability to improve ease of ADLs    18 min Neuromuscular Re-education:  []  See flow sheet :   Rationale: increase strength and increase proprioception  to improve the patients ability to increase core stability to reduce strain on low back     With   [] TE   [x] TA   [] neuro   [] other: Patient Education: [x] Review HEP    [] Progressed/Changed HEP based on:   [] positioning   [] body mechanics   [] transfers   [] heat/ice application    [] other:      Other Objective/Functional Measures: Performed exercises per flow sheet. Reassessed goals. Pain Level (0-10 scale) post treatment: 7-8/10    ASSESSMENT/Changes in Function: Pt and PT discussed his progress today. Pt notes some increase in bowel and bladder urgency since start of care, having to rush more to get to bathroom now. Pt continues to deny saddle paresthesias. Pt notes slight improvements in function since start of skilled PT but pain levels continue to be high. Reviewed most effective HEP for Pt to perform consistently and gave GTB for home. Pt has follow up next month - earliest he could get in at [facility]. Pt will finish last appt and be placed on hold until after MD appt to determine need for further skilled PT.       Patient will continue to benefit from skilled PT services to modify and progress therapeutic interventions, address functional mobility deficits, address ROM deficits, address strength deficits, analyze and address soft tissue restrictions, analyze and cue movement patterns, analyze and modify body mechanics/ergonomics, assess and modify postural abnormalities, address imbalance/dizziness and instruct in home and community integration to attain remaining goals. []  See Plan of Care  []  See progress note/recertification  []  See Discharge Summary         Progress towards goals / Updated goals:  1. Pt will report compliance and independence to [facility] to help the pt manage their pain and symptoms.           Status at last note/certification: intermittent compliance  Current:  \"I try to do them twice a week when I'm not here\" 03/03/2021  1. Pt will increase FOTO score to 45 points to improve ability to perform ADLs. Status at last note/certification: FOTO 23 pts   Current: reassess at MD note  2. Pt will increase MMT left hip flex to 3/5, left knee EXT to 3/5 to improve ability to tolerate prolonged sitting. Status at last note/certification: left hip flexion 2/5; left knee ext 2/5; knee flex 2+/5   Current: reassess next visit  3. Pt will increase AROM l/s flex to 25-50% of WNL, EXT to % of WNL, right rotation to 50-75% of WNL with mild to no increased LBP to improve ability to perform work activities. Status at last note/certification: flexion 25%, extension 25%, left rotation 50%, right 25% limited by pain in all directions  Current: not met - Flex/Ext 25%, Rot left 50%, right 25% - still with pain  4. Pt will report having moderate difficulty with performing his usual work, housework, or school activities to improve the pt's activity tolerance.   Status at last note/certification: improving but still moderate to extreme difficulty.   Current: progressing - quite a bit of difficulty with usual work, housework activities       PLAN  [x]  Upgrade activities as tolerated     []  Continue plan of care  []  Update interventions per flow sheet       []  Discharge due to:_  [x]  Other:_ goals and PN next visit     Mona Bourne, PT 3/16/2021  10:40 AM    Future Appointments   Date Time Provider Arturo Farnsworth   3/23/2021  9:45 AM Minal Adames [facility]

## 2021-03-24 NOTE — PROGRESS NOTES
[facility], [address] [place] [address]  [phone] fax    Progress Note  Patient name: Jeanne Castro Start of Care: 2021   Referral source: Naomy Paez MD : 1975   Medical/Treatment Diagnosis: Low back pain [M54.5]  Payor: Davonte Martin / Plan: [employer] / Product Type: [employer] Programs /  Onset Date:initial 2003, fell in 2020      Prior Hospitalization: see medical history Provider#: [id number]   Medications: Verified on Patient Summary List     Comorbidities: HTN, hx tobacco and alcohol use  Prior Level of Function: Independent with ADLs, functional, and work tasks with progressively worsening LBP and left LE weakness. Visits from Start of Care: 8    Missed Visits: 2    1. Pt will report compliance and independence to [facility] to help the pt manage their pain and symptoms.           Status at last note/certification: intermittent compliance  Current: met - Pt reports compliance  1. Pt will increase FOTO score to 45 points to improve ability to perform ADLs. Status at last note/certification: FOTO 23 pts   Current: not met - unable to reassess  2. Pt will increase MMT left hip flex to 3/5, left knee EXT to 3/5 to improve ability to tolerate prolonged sitting. Status at last note/certification: left hip flexion 2/5; left knee ext 2/5; knee flex 2+/5   Current: not met - unable to reassess  3. Pt will increase AROM l/s flex to 25-50% of WNL, EXT to % of WNL, right rotation to 50-75% of WNL with mild to no increased LBP to improve ability to perform work activities. Status at last note/certification: flexion 25%, extension 25%, left rotation 50%, right 25% limited by pain in all directions  Current: not met - Flex/Ext 25%, Rot left 50%, right 25% - still with pain  4.  Pt will report having moderate difficulty with performing his usual work, housework, or school activities to improve the pt's activity tolerance.   Status at last note/certification: improving but still moderate to extreme difficulty. Current: progressing - quite a bit of difficulty with usual work, housework activities    Key Functional Changes: Pt and PT discussed his progress today. Pt notes some increase in bowel and bladder urgency since start of care, having to rush more to get to bathroom now. Pt continues to deny saddle paresthesias. Pt notes slight improvements in function since start of skilled PT but pain levels continue to be high. Reviewed most effective HEP for Pt to perform consistently and gave GTB for home. Pt has follow up next month - earliest he could get in at [place]. Pt will finish last appt and be placed on hold until after MD appt to determine need for further skilled PT. It is recommended Pt discharge at this time due to lack of appreciable progress with skilled therapy and follow up with MD regarding other treatment options to reduce and manage pain levels.      ASSESSMENT/RECOMMENDATIONS:  []Continue therapy per initial plan/protocol at a frequency of   x per week for  weeks  []Continue therapy with the following recommended changes:_____________________      _____________________________________________________________________  []Discontinue therapy progressing towards or have reached established goals  [x]Discontinue therapy due to lack of appreciable progress towards goals  []Discontinue therapy due to lack of attendance or compliance  [x]Await Physician's recommendations/decisions regarding therapy  []Other:________________________________________________________________    Thank you for this referral.   Nav Bourne, PT 3/24/2021 6:32 PM  NOTE TO PHYSICIAN:  [address] AND   [facility]: [phone]  If you are unable to process this request in 24 hours please contact our office: [phone]  []  I have read the above report and request that my patient continue as recommended. []  I have read the above report and request that my patient continue therapy with the following changes/special instructions:________________________________________  []I have read the above report and request that my patient be discharged from therapy.     [de-identified] Signature:____________Date:_________TIME:________    [facility], Date and Time must be completed for valid certification **